# Patient Record
Sex: MALE | Race: WHITE | NOT HISPANIC OR LATINO | Employment: FULL TIME | URBAN - METROPOLITAN AREA
[De-identification: names, ages, dates, MRNs, and addresses within clinical notes are randomized per-mention and may not be internally consistent; named-entity substitution may affect disease eponyms.]

---

## 2018-12-17 ENCOUNTER — OFFICE VISIT (OUTPATIENT)
Dept: URGENT CARE | Facility: PHYSICIAN GROUP | Age: 23
End: 2018-12-17
Payer: COMMERCIAL

## 2018-12-17 ENCOUNTER — TELEPHONE (OUTPATIENT)
Dept: SCHEDULING | Facility: IMAGING CENTER | Age: 23
End: 2018-12-17

## 2018-12-17 VITALS
TEMPERATURE: 98.3 F | HEART RATE: 73 BPM | RESPIRATION RATE: 14 BRPM | OXYGEN SATURATION: 98 % | WEIGHT: 183 LBS | SYSTOLIC BLOOD PRESSURE: 128 MMHG | DIASTOLIC BLOOD PRESSURE: 86 MMHG

## 2018-12-17 DIAGNOSIS — J30.9 ALLERGIC SINUSITIS: ICD-10-CM

## 2018-12-17 DIAGNOSIS — R05.9 COUGH: ICD-10-CM

## 2018-12-17 PROCEDURE — 99204 OFFICE O/P NEW MOD 45 MIN: CPT | Performed by: PHYSICIAN ASSISTANT

## 2018-12-17 RX ORDER — BENZONATATE 200 MG/1
200 CAPSULE ORAL 3 TIMES DAILY PRN
Qty: 30 CAP | Refills: 0 | Status: SHIPPED | OUTPATIENT
Start: 2018-12-17

## 2018-12-17 RX ORDER — FLUTICASONE PROPIONATE 50 MCG
1 SPRAY, SUSPENSION (ML) NASAL 2 TIMES DAILY
Qty: 16 G | Refills: 0 | Status: SHIPPED | OUTPATIENT
Start: 2018-12-17

## 2018-12-17 NOTE — LETTER
December 17, 2018         Patient: Jose Shah   YOB: 1995   Date of Visit: 12/17/2018           To Whom it May Concern:    Jose Shah was seen in my clinic on 12/17/2018. Please excuse from work today.    If you have any questions or concerns, please don't hesitate to call.        Sincerely,           Liane Partida P.A.-C.  Electronically Signed

## 2018-12-17 NOTE — PROGRESS NOTES
Chief Complaint   Patient presents with   • Cough     x 2-3 weeks. Worse at work.        HISTORY OF PRESENT ILLNESS: Patient is a 23 y.o. male who presents today for 2-3 weeks of overall worsened cough.   No major congestion/sinus pressure.  No sore throat.  The cough is mostly dry, he does not have much mucus production. He feels there is mucus in his chest.  He has not had fevers.  No nausea or vomiting.  He took some NyQuil this morning.   Does not feel SOB but states when he takes deep breaths it exacerbates the cough.   Denies chest pain or painful breathing.  No abdominal pain, burning in chest, belching or hx of GERD.       There are no active problems to display for this patient.      Allergies:Patient has no known allergies.    No current [facility]-ordered outpatient prescriptions on file.     No current [facility]-ordered facility-administered medications on file.        No past medical history on file.    Social History   Substance Use Topics   • Smoking status: Never Smoker   • Smokeless tobacco: Never Used   • Alcohol use Not on file       No family status information on file.   No family history on file. No pertinent FH    ROS:  Review of Systems   Constitutional: Negative for fever, chills, weight loss and malaise/fatigue.    HENT: SEE HPI    Eyes: Negative for blurred vision.   Respiratory: JANAK HPI   Cardiovascular: SEE HPI  Gastrointestinal: Negative for heartburn, nausea, vomiting and abdominal pain.   Genitourinary: Negative for dysuria, urgency and frequency.   All other systems reviewed and are negative.       Exam:  Blood pressure 128/86, pulse 73, temperature 36.8 °C (98.3 °F), resp. rate 14, weight 83 kg (183 lb), SpO2 98 %.  General:  Well nourished, well developed male in NAD  Head: normocephalic/atraumatic.   Eyes: PERRLA, EOM within normal limits, no conjunctival injection, no scleral icterus, visual fields and acuity grossly intact.  Ears: Normal shape and symmetry, no tenderness, no discharge.  External canals are without any significant edema or erythema. Tympanic membranes are without any inflammation, no effusion. Gross auditory acuity is intact  Nose: Symmetrical, sinuses without tenderness, boggy turbinates, clear rhinorrhea.   Mouth: reasonable hygiene, no erythema exudates or tonsillar enlargement.  Neck: no masses, range of motion within normal limits, no tracheal deviation. No lymphadenopathy  Pulmonary: Normal respiratory effort, no wheezes, crackles, or rhonchi.  Cardiovascular: regular rate and rhythm without murmurs, rubs, or gallops.  Abdomen: Soft, nontender, nondistended. Normal bowel sounds. No hepatosplenomegaly or masses, or hernias. No rebound or guarding.  Skin: No visible rashes or lesion. Warm, pink, dry.   Extremities: no clubbing, cyanosis, or edema.  Neuro: A&O x 3. Speech normal/clear.  Normal gait.   All other systems reviewed and are negative.         Assessment/Plan:  1. Cough  fluticasone (FLONASE ALLERGY RELIEF) 50 MCG/ACT nasal spray    benzonatate (TESSALON) 200 MG capsule   2. Allergic sinusitis           -lungs CTA.    -strep negative.   -discussed that I felt this was allergy in nature. Did not see any evidence of a bacterial process. Discussed natural progression and sx care.  -OTC antihistamine, Flonase, saline irrigation/nasal and sinus care discussed, humidifier and/orsteamy showers to soothe lungs  -work note provided.         Supportive care, differential diagnoses, and indications for immediate follow-up discussed with patient.   Pathogenesis of diagnosis discussed including typical length and natural progression.   Instructed to return to clinic or nearest emergency department for any change in condition, further concerns, or worsening of symptoms.  Patient states understanding of the plan of care and discharge instructions.  Instructed to make an appointment, for follow up, with their primary care provider.      Liane Partida P.A.-C.

## 2021-01-15 ENCOUNTER — HOSPITAL ENCOUNTER (OUTPATIENT)
Facility: MEDICAL CENTER | Age: 26
End: 2021-01-15
Attending: EMERGENCY MEDICINE
Payer: COMMERCIAL

## 2021-01-15 ENCOUNTER — OFFICE VISIT (OUTPATIENT)
Dept: URGENT CARE | Facility: PHYSICIAN GROUP | Age: 26
End: 2021-01-15
Payer: COMMERCIAL

## 2021-01-15 VITALS
HEART RATE: 76 BPM | TEMPERATURE: 97.9 F | OXYGEN SATURATION: 89 % | RESPIRATION RATE: 16 BRPM | DIASTOLIC BLOOD PRESSURE: 82 MMHG | BODY MASS INDEX: 27.2 KG/M2 | HEIGHT: 70 IN | SYSTOLIC BLOOD PRESSURE: 132 MMHG | WEIGHT: 190 LBS

## 2021-01-15 DIAGNOSIS — J06.9 VIRAL UPPER RESPIRATORY TRACT INFECTION: ICD-10-CM

## 2021-01-15 DIAGNOSIS — R43.2 AGEUSIA: ICD-10-CM

## 2021-01-15 PROCEDURE — U0005 INFEC AGEN DETEC AMPLI PROBE: HCPCS

## 2021-01-15 PROCEDURE — 99202 OFFICE O/P NEW SF 15 MIN: CPT | Performed by: EMERGENCY MEDICINE

## 2021-01-15 PROCEDURE — U0003 INFECTIOUS AGENT DETECTION BY NUCLEIC ACID (DNA OR RNA); SEVERE ACUTE RESPIRATORY SYNDROME CORONAVIRUS 2 (SARS-COV-2) (CORONAVIRUS DISEASE [COVID-19]), AMPLIFIED PROBE TECHNIQUE, MAKING USE OF HIGH THROUGHPUT TECHNOLOGIES AS DESCRIBED BY CMS-2020-01-R: HCPCS

## 2021-01-15 ASSESSMENT — ENCOUNTER SYMPTOMS
RHINORRHEA: 1
SINUS PAIN: 1
VOMITING: 0
CHILLS: 0
FEVER: 0
DIARRHEA: 0
MYALGIAS: 1
NAUSEA: 0
HEADACHES: 0
COUGH: 1
SORE THROAT: 1

## 2021-01-16 DIAGNOSIS — R43.2 AGEUSIA: ICD-10-CM

## 2021-01-16 DIAGNOSIS — J06.9 VIRAL UPPER RESPIRATORY TRACT INFECTION: ICD-10-CM

## 2021-01-16 LAB
COVID ORDER STATUS COVID19: NORMAL
SARS-COV-2 RNA RESP QL NAA+PROBE: DETECTED
SPECIMEN SOURCE: ABNORMAL

## 2021-01-16 NOTE — PATIENT INSTRUCTIONS
••••••••••••••••••••••••••••••••        COVID-19 Test Results & Instructions (11-9-20)    After Your COVID-19 Test Stay Home      [address] [address]      P [phone]    Please stay home until you have received your test results, even if you do not have any symptoms.    What do I do if my test is …? Positive  If your test result was positive (detected), this means the novel coronavirus (SARS-CoV-2) that causes COVID-19 was present in your test sample.    If your symptoms are generally mild and stable, please isolate yourself at home. If it becomes difficult to breathe, contact your primary care provider (by phone) as soon as possible.    Next steps:    • Stay home except to get medical care.  • Follow the instructions for home isolation below.  • Call ahead before visiting your primary care provider or a hospital. Ask for an online virtual visit if possible.    When can my home isolation end?    You should isolate yourself:    • For a minimum of 10 days and  • At least 24 hours have passed since your last fever without the use of fever-reducing medications  and  • All other symptoms have improved.    Negative    If you tested negative (not detected), it is highly unlikely that you have COVID-19.    Several respiratory viruses can cause symptoms like yours, including the common cold or the flu.    1          Next steps:      [address] [address]      P [phone]    • Stay home while you are feeling sick.  • Monitor your symptoms and contact your primary care provider if they get worse.  • If you have questions, contact your primary care provider.    When can my home isolation end?    If you were tested because you had close contact (defined below) with someone with COVID-19, you should stay home for 14 days after your close contact with the person.    What counts as close contact?    • You were within 6 feet of someone who has COVID-19 for a total of 15 minutes  or more;  • You provided care at home to someone who is sick with COVID-19;  • You had direct physical contact with the person (hugged or kissed them);  • You shared eating or drinking utensils;  • They sneezed, coughed, or somehow got respiratory droplets on you.    If you were tested because you were exposed to a household contact with COVID-19, you should still quarantine yourself for a period of 14 days. The 14-day quarantine period begins once your affected household contact is isolated. If you are unable to quarantine yourself from your affected household contact, the 14-day quarantine period begins when the patient meets criteria to break isolation.    If you were not exposed to a household contact with COVID-19, you may still be contagious while experiencing symptoms, so you should not return to work or regular activities until 24 hours after symptoms fully improve. For example, if you feel back to normal on Tuesday, you should remain isolated until Wednesday.    Inconclusive    If your test result was inconclusive, this means the novel coronavirus (SARS-CoV-2) that causes COVID- 19 may be present in your test sample.    An inconclusive test result is treated the same as a positive test result.    If your symptoms are generally mild and stable, please isolate yourself at home. If it becomes difficult to breathe, contact your primary care provider.    2          Next steps:      [address] [address]      P [phone]    • Stay home except to get medical care.  • Follow the instructions for home isolation below.  • Call ahead before visiting your primary care provider or a hospital.    When can my home isolation end?    You should isolate yourself:    • For a minimum of 10 days and  • At least 24 hours have passed since your last fever without the use of fever-reducing medications and  • All other symptoms have improved.    Instructions for Self-Isolation at Home  We recommend you  "stay in your home and minimize contact with others to avoid spreading this infection.    Stay home except to get medical care.    Do not go to work, school or public areas. Avoid using public transportation, ridesharing or taxis.    Separate yourself from other people in your home as much as possible.    Stay in a specific room and away from other people in your home as much as possible. Use a separate bathroom if possible.    Clean your hands often.    Wash your hands often with soap and water for at least 20 seconds. If soap and water are not available, clean your hands with an alcohol-based hand  that contains at least 60% alcohol, covering all surfaces of your hands and rubbing them together until they feel dry. Avoid touching your eyes, nose and mouth with unwashed hands.    Do not share household items with other people in your home.    This includes sharing dishes, drinking glasses, cups, eating utensils, towels or bedding. After using these items, they should be washed thoroughly with soap and water.    3          Clean all \"high-touch\" surfaces regularly.      [address] [address]      P [phone]    This includes counters, tabletops, doorknobs, bathroom fixtures, toilets, phones, keyboards, tablets and bedside tables. Also, clean any surfaces that may have blood, stool or body fluids on them. Use a household cleaning spray or wipe, according to the label instructions.    Cover your coughs and sneezes with a tissue, mask or the inside of your elbow.    Throw used tissues in a lined trash can; immediately wash your hands with soap and water for at least 20 seconds or clean your hands with an alcohol-based hand  that contains at least 60% alcohol.  Soap and water should be used if hands are visibly dirty.    When seeking care at a healthcare facility:    • Seek prompt medical attention if your illness is worsening (e.g., difficulty breathing).  • When possible, " call the healthcare provider before arriving.  • Put on a face mask before you enter the facility.  • If possible, put on a face mask before the ambulance or paramedics arrive.  • These steps will help the healthcare provider's office prevent other people from getting infected or exposed.    Detailed information about these steps can be found on the Centers for Disease Control and Prevention's website.    4

## 2021-01-16 NOTE — PROGRESS NOTES
"Subjective:      Jose Shah is a 25 y.o. male who presents with Other (lost of taste of smell, naseua when driving ( 2x days) )            URI   This is a new problem. Episode onset: 5 days. The problem has been gradually improving. There has been no fever. Associated symptoms include congestion, coughing, rhinorrhea, sinus pain and a sore throat. Pertinent negatives include no chest pain, diarrhea, ear pain, headaches, nausea, rash or vomiting.       Review of Systems   Constitutional: Negative for chills and fever.   HENT: Positive for congestion, nosebleeds, rhinorrhea, sinus pain and sore throat. Negative for ear pain.         New ageusia this week   Respiratory: Positive for cough.    Cardiovascular: Negative for chest pain.   Gastrointestinal: Negative for diarrhea, nausea and vomiting.   Musculoskeletal: Positive for myalgias.   Skin: Negative for rash.   Neurological: Negative for headaches.     No past medical history on file.  No past surgical history on file.   Allergy:  Patient has no known allergies.     Current Outpatient Medications:   •  fluticasone, 1 Spray, Nasal, BID (Patient not taking: Reported on 1/15/2021), Not Taking  •  benzonatate, 200 mg, Oral, TID PRN (Patient not taking: Reported on 1/15/2021), Not Taking   family history is not on file.   Social History     Tobacco Use   • Smoking status: Never Smoker   • Smokeless tobacco: Never Used   Substance Use Topics   • Alcohol use: Not on file   • Drug use: Not on file         Objective:     /82 (BP Location: Left arm, Patient Position: Sitting, BP Cuff Size: Adult)   Pulse 76   Temp 36.6 °C (97.9 °F) (Temporal)   Resp 16   Ht 1.778 m (5' 10\") Comment: per pt  Wt 86.2 kg (190 lb) Comment: per pt  SpO2 89%   BMI 27.26 kg/m²      Physical Exam  Constitutional:       General: He is not in acute distress.     Appearance: He is well-developed. He is not ill-appearing or toxic-appearing.   HENT:      Head: Normocephalic.      Jaw: " No trismus.      Right Ear: Hearing and external ear normal.      Left Ear: Hearing and external ear normal.      Nose: Mucosal edema present. No rhinorrhea.      Right Nostril: No epistaxis.      Mouth/Throat:      Pharynx: Uvula midline. No oropharyngeal exudate or posterior oropharyngeal erythema.   Eyes:      Conjunctiva/sclera: Conjunctivae normal.   Neck:      Musculoskeletal: Neck supple.      Trachea: Trachea normal.   Cardiovascular:      Rate and Rhythm: Normal rate and regular rhythm.      Heart sounds: Normal heart sounds.   Pulmonary:      Effort: Pulmonary effort is normal.      Breath sounds: Normal breath sounds.   Lymphadenopathy:      Cervical: No cervical adenopathy.   Skin:     General: Skin is warm and dry.   Neurological:      Mental Status: He is alert.   Psychiatric:         Behavior: Behavior is cooperative.                 Assessment/Plan:        1. Viral upper respiratory tract infection  Recommended supportive care measures, including rest, increasing oral fluid intake and use of over-the-counter medications for relief of symptoms.  - SARS-CoV-2 PCR (24 hour In-House): Collect NP swab in V[facility]; Future    2. Ageusia  Home isolation  - SARS-CoV-2 PCR (24 hour In-House): Collect NP swab in VTM; Future

## 2021-01-17 ENCOUNTER — TELEPHONE (OUTPATIENT)
Dept: URGENT CARE | Facility: CLINIC | Age: 26
End: 2021-01-17

## 2021-01-17 NOTE — TELEPHONE ENCOUNTER
Please notify patient of positive test for Co-19; no change in treatment plan as discussed at visit.